# Patient Record
Sex: FEMALE | Race: OTHER | HISPANIC OR LATINO
[De-identification: names, ages, dates, MRNs, and addresses within clinical notes are randomized per-mention and may not be internally consistent; named-entity substitution may affect disease eponyms.]

---

## 2019-09-30 PROBLEM — Z00.00 ENCOUNTER FOR PREVENTIVE HEALTH EXAMINATION: Status: ACTIVE | Noted: 2019-09-30

## 2019-10-01 ENCOUNTER — APPOINTMENT (OUTPATIENT)
Dept: ORTHOPEDIC SURGERY | Facility: CLINIC | Age: 69
End: 2019-10-01
Payer: MEDICARE

## 2019-10-01 VITALS — DIASTOLIC BLOOD PRESSURE: 80 MMHG | SYSTOLIC BLOOD PRESSURE: 120 MMHG

## 2019-10-01 VITALS — BODY MASS INDEX: 30.58 KG/M2 | HEIGHT: 61 IN | WEIGHT: 162 LBS

## 2019-10-01 DIAGNOSIS — Z78.9 OTHER SPECIFIED HEALTH STATUS: ICD-10-CM

## 2019-10-01 PROCEDURE — 73070 X-RAY EXAM OF ELBOW: CPT | Mod: LT

## 2019-10-01 PROCEDURE — 99203 OFFICE O/P NEW LOW 30 MIN: CPT

## 2019-10-01 PROCEDURE — 73130 X-RAY EXAM OF HAND: CPT | Mod: LT

## 2019-10-01 NOTE — ASSESSMENT
[FreeTextEntry1] : 69 year old left hand dominant female with left nondisplaced radial neck fracture.  Patient is two weeks out from her injury.  She will be non-weight bearing to her left upper extremity.  She will followup in 4 weeks for repeat at radiographs.  She is encouraged to continue to range her elbow.  She can continue to take anti-inflammatories for pain.  She knows to call with any questions or concerns or if her symptoms acutely worsen.

## 2019-10-01 NOTE — HISTORY OF PRESENT ILLNESS
[de-identified] : 69 year old left hand dominant female s/p mechanical fall.  Patient had left upper extremity pain. She went home but continued pain caused her to present to [facility] ED. She had radiographs which showed a nondisplaced left radial neck fracture.  Since her injury her pain has continued to improve.  She is only taking NSAIDs for pain which helps. She has some left hand and left elbow pain.

## 2019-10-01 NOTE — PHYSICAL EXAM
[de-identified] : LEFT UPPER EXTREMITY\par tender to palpation of over lateral elbow in area of radial head\par no block to motion of forearm pronation and supination\par SILT medial, lateral and radial distributions\par +motor EPL/FPL/EDC/FDP/IO\par WWP distally, palpable radial pulse [de-identified] : Left elbow radiographs obtained in the office today show a nondisplaced radial neck fracture\par \par Left hand radiographs obtained in the office today shows no acute fracture or dislocation.

## 2019-10-10 ENCOUNTER — APPOINTMENT (OUTPATIENT)
Dept: ORTHOPEDIC SURGERY | Facility: CLINIC | Age: 69
End: 2019-10-10
Payer: MEDICARE

## 2019-10-10 PROCEDURE — 73070 X-RAY EXAM OF ELBOW: CPT | Mod: LT

## 2019-10-10 PROCEDURE — 99213 OFFICE O/P EST LOW 20 MIN: CPT

## 2019-10-10 NOTE — ASSESSMENT
[FreeTextEntry1] : 69 year old female with left closed radial neck fracture being treated conservatively.  The patient continues to have pain and her children wanted her placed in a cast.  We explained that fractures take 6 weeks to heal and will continue to have pain during this time.  This is exacerbated when weight bearing is done so she should be non-weight bearing.  We also talked to her about the reasons why these fractures are not immobilized.  Immobilization will lead to a stiff elbow and loss of range of motion.  For this reason we do not place these fractures in casts.  We provided her with a sling for comfort which she can wear when outside to remind people she has an injury.  When at home we ask that she take the sling off to perform active range of motion exercises of her elbow. All of her questions were answered.  She will followup at her previously scheduled appointment.  She knows to call with any questions or concerns.

## 2019-10-10 NOTE — HISTORY OF PRESENT ILLNESS
[de-identified] : Katelyn Ram is a 59 year old left hand dominant female with a left radial neck fracture who returns to office for evaluation.  She is here because she has pain and her children wanted her to be placed in a cast.  She admits that she has had trouble being non-weight bearing to that left upper extremity since it is her dominant arm.  She has pain when she tries and lift with the arm.  She also finds it difficult to sleep because of pain.

## 2019-10-10 NOTE — PHYSICAL EXAM
[de-identified] : LEFT UPPER EXTREMITY\par tender to palpation of over lateral elbow in area of radial head\par no block to motion of forearm pronation and supination\par SILT median, ulnar and radial distributions\par +motor EPL/FPL/EDC/FDP/IO\par WWP distally [de-identified] : Left elbow radiographs obtained in the office today show no change in left radial neck fracture compared with radiographs taken on Oct 1, 2019.

## 2019-10-29 ENCOUNTER — APPOINTMENT (OUTPATIENT)
Dept: ORTHOPEDIC SURGERY | Facility: CLINIC | Age: 69
End: 2019-10-29
Payer: MEDICARE

## 2019-10-29 PROCEDURE — 73070 X-RAY EXAM OF ELBOW: CPT | Mod: LT

## 2019-10-29 PROCEDURE — 99213 OFFICE O/P EST LOW 20 MIN: CPT

## 2019-10-29 NOTE — ASSESSMENT
[FreeTextEntry1] : 69 year old female returns for followup for L radial neck fracture.  She says she is now 6 weeks post-injury. She has no pain and excellent motion of her elbow.  She can start weight-bearing.  In regards to her left index finger pain she will be sent to occupational therapy.  She will followup in 6 weeks.  She knows to call with any questions or concerns or if her symptoms acutely worsen.

## 2019-10-29 NOTE — HISTORY OF PRESENT ILLNESS
[de-identified] : 69 year old female returns for followup for L radial neck fracture.  She says she is significantly improved since her last visit.  She also says she has good range of motion of her left elbow.  She does still have some pain in her index finger which bothers her since it is her dominant hand.

## 2019-10-29 NOTE — PHYSICAL EXAM
[de-identified] : General: No acute distress, conversant, well-nourished.\par Head: Normocephalic, atraumatic\par Neck: trachea midline, FROM\par Heart: normotensive and normal rate and rhythm\par Lungs: No labored breathing\par Skin: No abrasions, no rashes, no edema\par Psych: Alert and oriented to person, place and time\par Extremities: no peripheral edema or digital cyanosis\par Gait: Normal gait. Can perform tandem gait.  \par Vascular: warm and well perfused distally, palpable distal pulses\par \par LEFT UPPER EXTREMITY\par no tenderness to palpation of over lateral elbow in area of radial head\par flexion, extension, supination and pronation of left elbow intact, no block to ROM\par TTP at index finger, able to flex at DIP joint when immobilized PIP joint\par SILT median, ulnar and radial distributions and on ulnar and radial aspects of index finger\par +motor EPL/FPL/EDC/FDP/IO\par WWP distally \par  [de-identified] : Left elbow radiographs obtained in the office today show no change in alignment of left radial neck fracture

## 2019-12-03 ENCOUNTER — APPOINTMENT (OUTPATIENT)
Dept: ORTHOPEDIC SURGERY | Facility: CLINIC | Age: 69
End: 2019-12-03
Payer: MEDICARE

## 2019-12-03 DIAGNOSIS — S52.133A DISPLACED FRACTURE OF NECK OF UNSPECIFIED RADIUS, INITIAL ENCOUNTER FOR CLOSED FRACTURE: ICD-10-CM

## 2019-12-03 DIAGNOSIS — M79.642 PAIN IN LEFT HAND: ICD-10-CM

## 2019-12-03 DIAGNOSIS — M25.531 PAIN IN RIGHT WRIST: ICD-10-CM

## 2019-12-03 PROCEDURE — 99214 OFFICE O/P EST MOD 30 MIN: CPT

## 2019-12-03 PROCEDURE — 73110 X-RAY EXAM OF WRIST: CPT | Mod: RT

## 2019-12-03 NOTE — ASSESSMENT
[FreeTextEntry1] : 69 year old female here for followup for left radial neck fracture, left index finger DIP pain and new complaints of right wrist pain.  For her left radial neck fracture she is pain free with full motion and strength.  She was given new OT/PT script for her left index finger.  She will also work with PT/OT for her right wrist.  She can take anti-inflammatories as needed for pain.  She will call to make a followup appointment.  She knows to call with any questions or concerns or if her symptoms acutely worsen.

## 2019-12-03 NOTE — PHYSICAL EXAM
[de-identified] : General: No acute distress, conversant, well-nourished.\par Head: Normocephalic, atraumatic\par Neck: trachea midline, FROM\par Heart: normotensive and normal rate and rhythm\par Lungs: No labored breathing\par Skin: No abrasions, no rashes, no edema\par Psych: Alert and oriented to person, place and time\par Extremities: no peripheral edema or digital cyanosis\par Gait: Normal gait. Can perform tandem gait.  \par Vascular: warm and well perfused distally, palpable distal pulses\par \par MSK\par LEFT UPPER EXTREMITY\par no tenderness to palpation of over lateral elbow in area of radial head\par flexion, extension, supination and pronation of left elbow intact, no block to ROM\par TTP at index finger, able to flex at DIP joint when immobilized PIP joint\par SILT median, ulnar and radial distributions and on ulnar and radial aspects of index finger\par +motor EPL/FPL/EDC/FDP/IO\par WWP distally \par \par RIGHT UPPER EXTREMITY:\par mild redness and swelling at radial and palmar aspect of wrist\par mild tenderness to palpation\par no palpable foreign body\par no pain with range of motion\par SILT median, ulnar and radial distributions and on ulnar and radial aspects of index finger\par +motor EPL/FPL/EDC/FDP/IO\par WWP distally  [de-identified] : Right wrist radiographs including carpal tunnel view obtained in the office today shows no acute fracture or dislocation.

## 2019-12-03 NOTE — HISTORY OF PRESENT ILLNESS
[de-identified] : 69 year old female returns for followup for L radial neck fracture. She says the left elbow is pain free and she has great range of motion and strength.  She was not able to make it to OT for her left index finger.  She also has right wrist pain which she said happened after her fall but she was ignoring because her right upper extremity was more of a concern.  She says the pain in her right wrist is on the palmar and radial aspect.  There is no radiating pain, weakness, or numbness.

## 2021-12-09 VITALS — DIASTOLIC BLOOD PRESSURE: 80 MMHG | SYSTOLIC BLOOD PRESSURE: 140 MMHG | WEIGHT: 169 LBS | BODY MASS INDEX: 31.93 KG/M2

## 2023-10-02 DIAGNOSIS — Z92.89 PERSONAL HISTORY OF OTHER MEDICAL TREATMENT: ICD-10-CM

## 2023-10-02 DIAGNOSIS — Z83.49 FAMILY HISTORY OF OTHER ENDOCRINE, NUTRITIONAL AND METABOLIC DISEASES: ICD-10-CM

## 2023-10-02 DIAGNOSIS — Z82.49 FAMILY HISTORY OF ISCHEMIC HEART DISEASE AND OTHER DISEASES OF THE CIRCULATORY SYSTEM: ICD-10-CM

## 2023-10-02 DIAGNOSIS — Z78.0 ASYMPTOMATIC MENOPAUSAL STATE: ICD-10-CM

## 2023-10-02 DIAGNOSIS — Z86.2 PERSONAL HISTORY OF DISEASES OF THE BLOOD AND BLOOD-FORMING ORGANS AND CERTAIN DISORDERS INVOLVING THE IMMUNE MECHANISM: ICD-10-CM

## 2023-10-02 DIAGNOSIS — Z82.3 FAMILY HISTORY OF STROKE: ICD-10-CM

## 2023-10-02 DIAGNOSIS — F17.200 NICOTINE DEPENDENCE, UNSPECIFIED, UNCOMPLICATED: ICD-10-CM

## 2023-10-02 DIAGNOSIS — Z83.3 FAMILY HISTORY OF DIABETES MELLITUS: ICD-10-CM

## 2024-05-21 ENCOUNTER — APPOINTMENT (OUTPATIENT)
Dept: OBGYN | Facility: CLINIC | Age: 74
End: 2024-05-21
Payer: MEDICARE

## 2024-05-21 VITALS
DIASTOLIC BLOOD PRESSURE: 71 MMHG | SYSTOLIC BLOOD PRESSURE: 101 MMHG | HEIGHT: 61 IN | BODY MASS INDEX: 29.27 KG/M2 | HEART RATE: 76 BPM | WEIGHT: 155 LBS

## 2024-05-21 DIAGNOSIS — Z12.31 ENCOUNTER FOR SCREENING MAMMOGRAM FOR MALIGNANT NEOPLASM OF BREAST: ICD-10-CM

## 2024-05-21 DIAGNOSIS — Z01.419 ENCOUNTER FOR GYNECOLOGICAL EXAMINATION (GENERAL) (ROUTINE) W/OUT ABNORMAL FINDINGS: ICD-10-CM

## 2024-05-21 LAB
BILIRUB UR QL STRIP: NORMAL
CLARITY UR: CLEAR
COLLECTION METHOD: NORMAL
GLUCOSE UR-MCNC: 1000
HCG UR QL: 0.2 EU/DL
HGB UR QL STRIP.AUTO: NORMAL
KETONES UR-MCNC: NORMAL
LEUKOCYTE ESTERASE UR QL STRIP: NORMAL
NITRITE UR QL STRIP: NORMAL
PH UR STRIP: 6
PROT UR STRIP-MCNC: NORMAL
SP GR UR STRIP: 1

## 2024-05-21 PROCEDURE — G0101: CPT

## 2024-05-21 PROCEDURE — 81003 URINALYSIS AUTO W/O SCOPE: CPT | Mod: QW
